# Patient Record
Sex: MALE | Race: WHITE
[De-identification: names, ages, dates, MRNs, and addresses within clinical notes are randomized per-mention and may not be internally consistent; named-entity substitution may affect disease eponyms.]

---

## 2021-08-06 ENCOUNTER — HOSPITAL ENCOUNTER (OUTPATIENT)
Dept: HOSPITAL 47 - LABPAT | Age: 66
Discharge: HOME | End: 2021-08-06
Attending: ORTHOPAEDIC SURGERY
Payer: MEDICARE

## 2021-08-06 DIAGNOSIS — Z01.812: Primary | ICD-10-CM

## 2021-08-06 LAB
ANION GAP SERPL CALC-SCNC: 6 MMOL/L
APTT BLD: 24.9 SEC (ref 22–30)
BASOPHILS # BLD AUTO: 0 K/UL (ref 0–0.2)
BASOPHILS NFR BLD AUTO: 1 %
BILIRUB UR QL STRIP.AUTO: (no result)
CALCIUM SPEC-MCNC: 9 MG/DL (ref 8.4–10.2)
CHLORIDE SERPL-SCNC: 106 MMOL/L (ref 98–107)
CO2 SERPL-SCNC: 25 MMOL/L (ref 22–30)
EOSINOPHIL # BLD AUTO: 0.1 K/UL (ref 0–0.7)
EOSINOPHIL NFR BLD AUTO: 2 %
ERYTHROCYTE [DISTWIDTH] IN BLOOD: 14.3 % (ref 11.5–15.5)
GLUCOSE SERPL-MCNC: 87 MG/DL (ref 74–99)
INR PPP: 0.9 (ref ?–1.2)
LYMPHOCYTES # SPEC AUTO: 2.1 K/UL (ref 1–4.8)
LYMPHOCYTES NFR SPEC AUTO: 31 %
MCH RBC QN AUTO: 31 PG (ref 25–35)
MCHC RBC AUTO-ENTMCNC: 33.1 G/DL (ref 31–37)
MCV RBC AUTO: 93.6 FL (ref 80–100)
MONOCYTES # BLD AUTO: 0.5 K/UL (ref 0–1)
MONOCYTES NFR BLD AUTO: 7 %
NEUTROPHILS # BLD AUTO: 3.9 K/UL (ref 1.3–7.7)
NEUTROPHILS NFR BLD AUTO: 58 %
PH UR: 5.5 [PH] (ref 5–8)
PLATELET # BLD AUTO: 232 K/UL (ref 150–450)
POTASSIUM SERPL-SCNC: 4.4 MMOL/L (ref 3.5–5.1)
PT BLD: 9.8 SEC (ref 9–12)
SODIUM SERPL-SCNC: 137 MMOL/L (ref 137–145)
SP GR UR: 1.02 (ref 1–1.03)
UROBILINOGEN UR QL STRIP: <2 MG/DL (ref ?–2)
WBC # BLD AUTO: 6.8 K/UL (ref 3.8–10.6)

## 2021-08-06 PROCEDURE — 36415 COLL VENOUS BLD VENIPUNCTURE: CPT

## 2021-08-06 PROCEDURE — 80048 BASIC METABOLIC PNL TOTAL CA: CPT

## 2021-08-06 PROCEDURE — 81003 URINALYSIS AUTO W/O SCOPE: CPT

## 2021-08-06 PROCEDURE — 85025 COMPLETE CBC W/AUTO DIFF WBC: CPT

## 2021-08-06 PROCEDURE — 87086 URINE CULTURE/COLONY COUNT: CPT

## 2021-08-06 PROCEDURE — 85730 THROMBOPLASTIN TIME PARTIAL: CPT

## 2021-08-06 PROCEDURE — 87070 CULTURE OTHR SPECIMN AEROBIC: CPT

## 2021-08-06 PROCEDURE — 85610 PROTHROMBIN TIME: CPT

## 2021-08-12 LAB
BUN SERPL-SCNC: 21 MG/DL (ref 9–20)
ERYTHROCYTE [DISTWIDTH] IN BLOOD BY AUTOMATED COUNT: 5.12 M/UL (ref 4.3–5.9)
HCT VFR BLD AUTO: 47.9 % (ref 39–53)
HGB BLD-MCNC: 15.8 GM/DL (ref 13–17.5)

## 2021-08-16 ENCOUNTER — HOSPITAL ENCOUNTER (OUTPATIENT)
Dept: HOSPITAL 47 - OR | Age: 66
Discharge: HOME | End: 2021-08-16
Attending: ORTHOPAEDIC SURGERY
Payer: MEDICARE

## 2021-08-16 VITALS — DIASTOLIC BLOOD PRESSURE: 64 MMHG | SYSTOLIC BLOOD PRESSURE: 102 MMHG

## 2021-08-16 VITALS — HEART RATE: 68 BPM

## 2021-08-16 VITALS — RESPIRATION RATE: 16 BRPM

## 2021-08-16 VITALS — TEMPERATURE: 97.1 F

## 2021-08-16 VITALS — BODY MASS INDEX: 43.1 KG/M2

## 2021-08-16 DIAGNOSIS — I10: ICD-10-CM

## 2021-08-16 DIAGNOSIS — F32.9: ICD-10-CM

## 2021-08-16 DIAGNOSIS — I25.10: ICD-10-CM

## 2021-08-16 DIAGNOSIS — Z79.82: ICD-10-CM

## 2021-08-16 DIAGNOSIS — I35.0: ICD-10-CM

## 2021-08-16 DIAGNOSIS — M51.16: Primary | ICD-10-CM

## 2021-08-16 DIAGNOSIS — Z87.891: ICD-10-CM

## 2021-08-16 DIAGNOSIS — Z79.899: ICD-10-CM

## 2021-08-16 DIAGNOSIS — E78.5: ICD-10-CM

## 2021-08-16 DIAGNOSIS — Z95.5: ICD-10-CM

## 2021-08-16 DIAGNOSIS — Z79.02: ICD-10-CM

## 2021-08-16 PROCEDURE — 63047 LAM FACETEC & FORAMOT LUMBAR: CPT

## 2021-08-16 PROCEDURE — 86901 BLOOD TYPING SEROLOGIC RH(D): CPT

## 2021-08-16 PROCEDURE — 86850 RBC ANTIBODY SCREEN: CPT

## 2021-08-16 PROCEDURE — 86900 BLOOD TYPING SEROLOGIC ABO: CPT

## 2021-08-16 PROCEDURE — 72020 X-RAY EXAM OF SPINE 1 VIEW: CPT

## 2021-08-16 NOTE — P.OP
Date of Procedure: 08/16/21


Preoperative Diagnosis: 


Herniated nucleus pulposis L3 4, extruded disc L3 4, left lower extremity 

radiculopathy, left lower extremity weakness


Postoperative Diagnosis: 


Herniated nucleus pulposis L3 4, extruded disc L3 4, left lower extremity 

radiculopathy, left lower extremity weakness


Anesthesia: GETA


Pathology: none sent


Condition: stable


Disposition: PACU


Description of Procedure: 


BRIEF OPERATIVE NOTE


Preoperative Diagnosis:Herniated nucleus pulposis L3 4, extruded disc L3 4, left

lower extremity radiculopathy, left lower extremity weakness


Postoperative Diagnosis:Herniated nucleus pulposis L3 4, extruded disc L3 4, 

left lower extremity radiculopathy, left lower extremity weakness


Procedure: Laminectomy and decompression with partial medial facetectomy and 

foraminotomy L3 4


                  Discectomy for decompression L3 4


Surgeon: Dr. Coyne


Assistant: Vincenzo Caruso is present throughout the entire the case 

persistence during positioning, dissection, exposure, visualization, and all 

crucial elements of the case as well as closure.


Anesthesia: General anesthesia per Dr. Spicer


Estimated blood loss: Approximately 30 mL


Complications: None apparent


Components implanted: None


Disposition: To recovery room in good stable condition.





OPERATIVE INDICATIONS





The patient has been having issues in their lower back and lower extremities.  

He is having severe left lower extremity radiculopathy overnight L3 and L4 

distribution at the left lower extremity.  He was found have a disc herniation 

with extruded fragment L3 4 which "well with his low back and lower extremity 

symptoms.  He is having significant debility and problems in his mobility due to

his pain at left lower extremity.  The patient has been through conservative tr

eatment.  He is not having any benefit despite conservative treatment.  We 

discussed various treatment options including surgery, and the patient wishes to

proceed with surgery We discussed the risk, patient's alternatives and benefits 

of surgery including but not limited to, risk of bleeding risk of infection, 

risk of need for further surgery, risk of decreased, loss of motion, loss of 

function, nerve damage, paralysis, heart attack, blindness and death.





OPERATIVE SUMMARY





After discussing all the risks, patient alternatives and benefits at length, the

patient elected to proceed with surgical intervention, signed informed consent, 

and presented for their procedure.  The patient was seen and examined in the 

preoperative holding area and the surgical site was marked.  The patient was 

given antibiotics and brought to the operating room.





The patient was sedated and intubated by anesthesia in standard fashion.  The 

patient was positioned on to the operating room table in a prone position on the

appropriate frame which was well-padded and well molded.  We were careful to pad

any bony prominences and pressure points. We were careful to maintain the 

patient's cervical spine and good neutral alignment and position throughout.





The patient was prepped and draped in a normal standard fashion.  An appropriate

timeout and keystone protocol performed.  We were able to proceed with the cresencio

jesus manule.  Fluoroscopy was utilized to establish the appropriate level at L3 4.  The

local wound area was infiltrated with local anesthetic. 





An incision was made at the midline longitudinally over the appropriate levels 

at L3 4.  Dissection was taken down subcutaneously to the level of the fascia 

which was split midline.  Dissection was taken over the lamina. Intraoperative 

fluoroscopy was taken which showed a marker at the appropriate level.  With the 

appropriate level positively confirmed, we were able to proceed with laminectomy

at L3 4 on the left.  The wound was copiously irrigated and suctioned dry as had

been done periodically throughout the case.  I performed a laminectomy with a 

combination of curettes and a high-speed bur and Kerrison rongeurs.  A small 

medial facetectomy was performed again further access.  A partial foraminotomy 

was also performed.  Portions of the ligamentum flavum were taken down to expose

the dura and traversing nerve root.  I was able to mobilize the traversing nerve

root and gain access to the disc space.  Note was made of obvious compression 

from the disc.  Protecting the soft tissue structures, a small annulotomy was 

established.  I was able to perform discectomy and remove any extruded disc 

fragments and any loose fragments from within the disc itself.  There was 

extruded disc fragments which were removed as well as numerous loose fragments 

from the disc itself.  I was able to remove the extruded disc as well as the 

loose disc fragments.  There is some severe disc desiccation noted.  I tried to 

preserve the disc annulus that appeared stable.  There were no further extruded 

fragments noted.  There is no evidence of dural tear or leak.  Good hemostasis 

maintained.  The wound was copiously irrigated and suctioned dry.


Good decompression and discectomy was noted.





We were able to proceed with closure. The fascia was closed for a watertight 

closure.  The subcuticular tissue was closed with absorbable suture.  The wound 

was cleaned and dried and dressed with the appropriate dressing.  The drapes 

were broken down.  The patient was gently rolled back onto their hospital bed 

being careful to maintain their cervical spine and good neutral alignment and 

position.  They were woken up by anesthesia, extubated, and brought to the 

recovery room in good stable condition.





The patient will be admitted to the hospital for observation and for appropriate

postoperative care, medical management and monitoring.  We will continue to 

follow them closely about the postoperative course.

## 2021-08-16 NOTE — FL
EXAMINATION TYPE: FL guidance operating room

 

DATE OF EXAM: 8/16/2021

 

HISTORY: Fluoroscopy  time

 

3 seconds of fluoroscopy provided. 

 

IMPRESSION:

1. Fluoroscopy time.

## 2021-08-17 NOTE — XR
EXAMINATION TYPE: XR lumbar spine 1V

 

DATE OF EXAM: 8/16/2021

 

COMPARISON: NONE

 

HISTORY: Low back pain.

 

TECHNIQUE: Single crosstable lateral intraoperative view lumbar spine

 

FINDINGS: Exam is for surgical planning and not for diagnostic purposes. Metallic pointer noted at th
e superior posterior L4 level.

 

IMPRESSION: As above.